# Patient Record
Sex: FEMALE | Race: BLACK OR AFRICAN AMERICAN
[De-identification: names, ages, dates, MRNs, and addresses within clinical notes are randomized per-mention and may not be internally consistent; named-entity substitution may affect disease eponyms.]

---

## 2017-12-09 ENCOUNTER — HOSPITAL ENCOUNTER (OUTPATIENT)
Dept: HOSPITAL 62 - LC | Age: 27
Discharge: HOME | End: 2017-12-09
Attending: OBSTETRICS & GYNECOLOGY
Payer: COMMERCIAL

## 2017-12-09 DIAGNOSIS — Z3A.00: ICD-10-CM

## 2017-12-09 DIAGNOSIS — O46.92: Primary | ICD-10-CM

## 2017-12-09 DIAGNOSIS — O34.211: ICD-10-CM

## 2017-12-09 LAB
APPEARANCE UR: (no result)
BARBITURATES UR QL SCN: NEGATIVE
BILIRUB UR QL STRIP: NEGATIVE
GLUCOSE UR STRIP-MCNC: >=500 MG/DL
KETONES UR STRIP-MCNC: 80 MG/DL
METHADONE UR QL SCN: NEGATIVE
NITRITE UR QL STRIP: NEGATIVE
PCP UR QL SCN: NEGATIVE
PH UR STRIP: 6 [PH] (ref 5–9)
PROT UR STRIP-MCNC: NEGATIVE MG/DL
SP GR UR STRIP: 1.04
URINE OPIATES LOW: NEGATIVE
UROBILINOGEN UR-MCNC: NEGATIVE MG/DL (ref ?–2)

## 2017-12-09 PROCEDURE — 81001 URINALYSIS AUTO W/SCOPE: CPT

## 2017-12-09 PROCEDURE — 80307 DRUG TEST PRSMV CHEM ANLYZR: CPT

## 2017-12-09 PROCEDURE — 87086 URINE CULTURE/COLONY COUNT: CPT

## 2017-12-09 PROCEDURE — 4A1HXCZ MONITORING OF PRODUCTS OF CONCEPTION, CARDIAC RATE, EXTERNAL APPROACH: ICD-10-PCS | Performed by: OBSTETRICS & GYNECOLOGY

## 2018-01-03 ENCOUNTER — HOSPITAL ENCOUNTER (EMERGENCY)
Dept: HOSPITAL 62 - ER | Age: 28
Discharge: HOME | End: 2018-01-03
Payer: COMMERCIAL

## 2018-01-03 VITALS — DIASTOLIC BLOOD PRESSURE: 81 MMHG | SYSTOLIC BLOOD PRESSURE: 134 MMHG

## 2018-01-03 DIAGNOSIS — R42: ICD-10-CM

## 2018-01-03 DIAGNOSIS — Z79.4: ICD-10-CM

## 2018-01-03 DIAGNOSIS — Z3A.32: ICD-10-CM

## 2018-01-03 DIAGNOSIS — O24.913: Primary | ICD-10-CM

## 2018-01-03 LAB
ADD MANUAL DIFF: NO
ALBUMIN SERPL-MCNC: 3.3 G/DL (ref 3.5–5)
ALP SERPL-CCNC: 118 U/L (ref 38–126)
ALT SERPL-CCNC: 27 U/L (ref 9–52)
ANION GAP SERPL CALC-SCNC: 14 MMOL/L (ref 5–19)
APPEARANCE UR: (no result)
APTT PPP: YELLOW S
AST SERPL-CCNC: 15 U/L (ref 14–36)
BASOPHILS # BLD AUTO: 0.1 10^3/UL (ref 0–0.2)
BASOPHILS NFR BLD AUTO: 0.6 % (ref 0–2)
BILIRUB DIRECT SERPL-MCNC: 0.3 MG/DL (ref 0–0.4)
BILIRUB SERPL-MCNC: 0.6 MG/DL (ref 0.2–1.3)
BILIRUB UR QL STRIP: NEGATIVE
BUN SERPL-MCNC: 12 MG/DL (ref 7–20)
CALCIUM: 9.1 MG/DL (ref 8.4–10.2)
CHLORIDE SERPL-SCNC: 108 MMOL/L (ref 98–107)
CO2 SERPL-SCNC: 12 MMOL/L (ref 22–30)
EOSINOPHIL # BLD AUTO: 0 10^3/UL (ref 0–0.6)
EOSINOPHIL NFR BLD AUTO: 0.3 % (ref 0–6)
ERYTHROCYTE [DISTWIDTH] IN BLOOD BY AUTOMATED COUNT: 14.7 % (ref 11.5–14)
GLUCOSE SERPL-MCNC: 282 MG/DL (ref 75–110)
GLUCOSE UR STRIP-MCNC: >=500 MG/DL
HCT VFR BLD CALC: 43.8 % (ref 36–47)
HGB BLD-MCNC: 14.4 G/DL (ref 12–15.5)
KETONES UR STRIP-MCNC: 80 MG/DL
LYMPHOCYTES # BLD AUTO: 2.1 10^3/UL (ref 0.5–4.7)
LYMPHOCYTES NFR BLD AUTO: 19.1 % (ref 13–45)
MCH RBC QN AUTO: 31.5 PG (ref 27–33.4)
MCHC RBC AUTO-ENTMCNC: 32.8 G/DL (ref 32–36)
MCV RBC AUTO: 96 FL (ref 80–97)
MONOCYTES # BLD AUTO: 0.7 10^3/UL (ref 0.1–1.4)
MONOCYTES NFR BLD AUTO: 6.9 % (ref 3–13)
NEUTROPHILS # BLD AUTO: 7.8 10^3/UL (ref 1.7–8.2)
NEUTS SEG NFR BLD AUTO: 73.1 % (ref 42–78)
NITRITE UR QL STRIP: NEGATIVE
PH UR STRIP: 5 [PH] (ref 5–9)
PLATELET # BLD: 287 10^3/UL (ref 150–450)
POTASSIUM SERPL-SCNC: 5.2 MMOL/L (ref 3.6–5)
PROT SERPL-MCNC: 6.2 G/DL (ref 6.3–8.2)
PROT UR STRIP-MCNC: 100 MG/DL
RBC # BLD AUTO: 4.55 10^6/UL (ref 3.72–5.28)
SODIUM SERPL-SCNC: 134.4 MMOL/L (ref 137–145)
SP GR UR STRIP: 1.03
TOTAL CELLS COUNTED % (AUTO): 100 %
UROBILINOGEN UR-MCNC: NEGATIVE MG/DL (ref ?–2)
WBC # BLD AUTO: 10.7 10^3/UL (ref 4–10.5)

## 2018-01-03 PROCEDURE — 85025 COMPLETE CBC W/AUTO DIFF WBC: CPT

## 2018-01-03 PROCEDURE — 36415 COLL VENOUS BLD VENIPUNCTURE: CPT

## 2018-01-03 PROCEDURE — 81001 URINALYSIS AUTO W/SCOPE: CPT

## 2018-01-03 PROCEDURE — 96361 HYDRATE IV INFUSION ADD-ON: CPT

## 2018-01-03 PROCEDURE — 82962 GLUCOSE BLOOD TEST: CPT

## 2018-01-03 PROCEDURE — 80053 COMPREHEN METABOLIC PANEL: CPT

## 2018-01-03 PROCEDURE — 96360 HYDRATION IV INFUSION INIT: CPT

## 2018-01-03 PROCEDURE — 99285 EMERGENCY DEPT VISIT HI MDM: CPT

## 2018-01-03 NOTE — ER DOCUMENT REPORT
ED Medical Screen (RME)





- General


Chief Complaint: High Blood Sugar


Stated Complaint: BLOOD SUGAR CONCERNS


Time Seen by Provider: 01/03/18 09:59


Notes: 





Patient is sent from the [facility] clinic for elevated blood sugar.  She 

states she has been weak lightheaded and dizzy.  At clinic her blood sugar was 

328.  She states that she has not been treated for elevated glucose during this 

pregnancy.  This was her first elevated glucose reading.  She states she is 

approximately 32 weeks gestation.


TRAVEL OUTSIDE OF THE [place] IN LAST 30 DAYS: No





- Related Data


Allergies/Adverse Reactions: 


 





No Known Allergies Allergy (Verified 12/09/17 11:37)


 











Past Medical History





- Social History


Chew tobacco use (# tins/day): No


Frequency of alcohol use: None


Drug Abuse: None


Renal/ Medical History: Denies: Hx Peritoneal Dialysis





Physical Exam





- Vital signs


Vitals: 





 











Temp Pulse Resp BP Pulse Ox


 


 97.8 F   104 H  20   133/69 H  98 


 


 01/03/18 09:53  01/03/18 09:53  01/03/18 09:53  01/03/18 09:53  01/03/18 09:53














Course





- Vital Signs


Vital signs: 





 











Temp Pulse Resp BP Pulse Ox


 


 97.8 F   104 H  20   133/69 H  98 


 


 01/03/18 09:53  01/03/18 09:53  01/03/18 09:53  01/03/18 09:53  01/03/18 09:53

## 2018-01-03 NOTE — ER DOCUMENT REPORT
ED Blood Sugar Problem





- General


Chief Complaint: High Blood Sugar


Stated Complaint: BLOOD SUGAR CONCERNS


Time Seen by Provider: 18 09:59


Mode of Arrival: Ambulatory


Information source: Patient


Notes: 





Patient is currently 32 weeks  and reports that she was seen by her OB/GYN 

this afternoon and told she had an elevated blood sugar.  Patient states that 

she does occasionally feel lightheaded and dizzy.  Patient states she has had 

some cold symptoms for the past week and a half.  Patient denies any cough or 

fever.  Patient denies any vaginal bleeding or discharge.  Patient does report 

positive fetal movement.  Patient denies any urinary complaints.  Patient 

states she has not had any consistent prenatal care with this pregnancy due to 

lack of insurance and multiple moves recently.  Patient is only previously seen 

the OB/GYN doctor today and on one prior occasion.


TRAVEL OUTSIDE OF THE [place] IN LAST 30 DAYS: No





- HPI


Onset: Just prior to arrival


Onset/Duration: Gradual


Quality of pain: No pain


Pain Level: Denies


Associated symptoms: Dizziness, Increased thirst, Frequent urination.  denies: 

Loss of consciousness, Vomiting


Similar symptoms previously: No


Recently seen / treated by doctor: Yes





- Related Data


Allergies/Adverse Reactions: 


 





No Known Allergies Allergy (Verified 17 11:37)


 











Past Medical History





- General


Information source: Patient


Last Menstrual Period: 32 weeks pregnant





- Social History


Smoking Status: Never Smoker


Chew tobacco use (# tins/day): No


Frequency of alcohol use: None


Drug Abuse: None


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


Lives with: Family


Family History: Reviewed & Not Pertinent


Patient has suicidal ideation: No


Patient has homicidal ideation: No





- Medical History


Medical History: Negative


Renal/ Medical History: Denies: Hx Peritoneal Dialysis


Past Surgical History: Reports: Hx  Section





Review of Systems





- Review of Systems


Constitutional: Recent illness - cold symptoms.  denies: Fever


EENT: Nose congestion


Cardiovascular: Dizziness, Lightheaded.  denies: Chest pain


Respiratory: No symptoms reported.  denies: Cough, Short of breath


Gastrointestinal: No symptoms reported.  denies: Abdominal pain, Nausea, 

Vomiting


Genitourinary: Frequency


Female Genitourinary: No symptoms reported, Pregnant


Musculoskeletal: No symptoms reported


Skin: No symptoms reported


Hematologic/Lymphatic: No symptoms reported


Neurological/Psychological: No symptoms reported.  denies: Headaches





Physical Exam





- Vital signs


Vitals: 


 











Temp Pulse Resp BP Pulse Ox


 


 97.8 F   104 H  20   133/69 H  98 


 


 01/03/18 09:53  18 09:53  18 09:53  18 09:53  18 09:53














- General


General appearance: Appears well, Alert


In distress: None





- HEENT


Head: Normocephalic, Atraumatic


Eyes: Normal


Conjunctiva: Normal


Nasal: Normal


Mouth/Lips: Normal


Mucous membranes: Dry


Pharynx: Normal


Neck: Normal, Supple.  No: Lymphadenopathy





- Respiratory


Respiratory status: No respiratory distress


Chest status: Nontender


Breath sounds: Normal.  No: Rales, Rhonchi, Stridor, Wheezing


Chest palpation: Normal





- Cardiovascular


Rhythm: Regular


Heart sounds: S1 appreciated, S2 appreciated


Murmur: No





- Abdominal


Inspection: Gravid female


Distension: No distension


Bowel sounds: Normal


Tenderness: Nontender


Organomegaly: No organomegaly





- Back


Back: Normal, Nontender.  No: CVA tenderness





- Extremities


General upper extremity: Normal inspection, Normal ROM


General lower extremity: Normal inspection, Normal ROM.  No: Edema





- Neurological


Neuro grossly intact: Yes


Cognition: Normal


[place] Coma Scale Eye Opening: Spontaneous


[place] Coma Scale Verbal: Oriented


[place] Coma Scale Motor: Obeys Commands


[place] Coma Scale Total: 15





- Psychological


Associated symptoms: Normal affect, Normal mood





- Skin


Skin Temperature: Warm


Skin Moisture: Dry


Skin Color: Normal





Course





- Re-evaluation


Re-evalutation: 





18 13:04


Patient reports that she is feeling much better after IV fluids.  Consulted 

with Dr. Marx regarding patient presentation, reviewed patient's diagnostic 

test results.  Advises consultation with OB/GYN provider.





Consulted with Dr. Burgos who states that patient was only sent here for 

fluids and insulin as I do not have insulin in the office.  Dr. Burgos states 

that patient had a prescription for glyburide 5 mg twice a day E scribed to the 

pharmacy already and that she should just take this to manage her diabetes and 

to follow-up in the office on Friday as planned.











- Vital Signs


Vital signs: 


 











Temp Pulse Resp BP Pulse Ox


 


 97.8 F   94   17   134/81 H  99 


 


 18 09:53  18 13:23  18 13:23  18 13:23  18 13:23














- Laboratory


Result Diagrams: 


 18 10:30





 18 11:52


Laboratory results interpreted by me: 


 











  18





  09:55 10:30 10:30


 


WBC   10.7 H 


 


RDW   14.7 H 


 


Sodium   


 


Potassium   


 


Chloride   


 


Carbon Dioxide   


 


Glucose   


 


POC Glucose  327 H  


 


Total Protein   


 


Albumin   


 


Urine Protein    100 H


 


Urine Glucose (UA)    >=500 H


 


Urine Ketones    80 H














  18





  11:52


 


WBC 


 


RDW 


 


Sodium  134.4 L


 


Potassium  5.2 H


 


Chloride  108 H


 


Carbon Dioxide  12 L


 


Glucose  282 H


 


POC Glucose 


 


Total Protein  6.2 L


 


Albumin  3.3 L


 


Urine Protein 


 


Urine Glucose (UA) 


 


Urine Ketones 











18 13:05





 Labs- Entire Visit











  18





  09:55 10:30 10:30


 


WBC   10.7 H 


 


RBC   4.55 


 


Hgb   14.4 


 


Hct   43.8 


 


MCV   96 


 


MCH   31.5 


 


MCHC   32.8 


 


RDW   14.7 H 


 


Plt Count   287 


 


Seg Neutrophils %   73.1 


 


Lymphocytes %   19.1 


 


Monocytes %   6.9 


 


Eosinophils %   0.3 


 


Basophils %   0.6 


 


Absolute Neutrophils   7.8 


 


Absolute Lymphocytes   2.1 


 


Absolute Monocytes   0.7 


 


Absolute Eosinophils   0.0 


 


Absolute Basophils   0.1 


 


Sodium    Cancelled


 


Potassium    Cancelled


 


Chloride    Cancelled


 


Carbon Dioxide    Cancelled


 


Anion Gap    Cancelled


 


BUN    Cancelled


 


Creatinine    Cancelled


 


Est GFR ( Amer)    Cancelled


 


Est GFR (Non-Af Amer)    Cancelled


 


Glucose    Cancelled


 


POC Glucose  327 H  


 


Calcium    Cancelled


 


Total Bilirubin    Cancelled


 


Direct Bilirubin    Cancelled


 


Neonat Total Bilirubin    Cancelled


 


Neonat Direct Bilirubin    Cancelled


 


Neonat Indirect Bili    Cancelled


 


AST    Cancelled


 


ALT    Cancelled


 


Alkaline Phosphatase    Cancelled


 


Total Protein    Cancelled


 


Albumin    Cancelled


 


Urine Color   


 


Urine Appearance   


 


Urine pH   


 


Ur Specific Gravity   


 


Urine Protein   


 


Urine Glucose (UA)   


 


Urine Ketones   


 


Urine Blood   


 


Urine Nitrite   


 


Urine Bilirubin   


 


Urine Urobilinogen   


 


Ur Leukocyte Esterase   


 


Urine WBC (Auto)   


 


Urine RBC (Auto)   


 


U Hyaline Cast (Auto)   


 


Urine Bacteria (Auto)   


 


Squamous Epi Cells Auto   


 


Urine Mucus (Auto)   


 


Urine Ascorbic Acid   














  18





  10:30 11:52


 


WBC  


 


RBC  


 


Hgb  


 


Hct  


 


MCV  


 


MCH  


 


MCHC  


 


RDW  


 


Plt Count  


 


Seg Neutrophils %  


 


Lymphocytes %  


 


Monocytes %  


 


Eosinophils %  


 


Basophils %  


 


Absolute Neutrophils  


 


Absolute Lymphocytes  


 


Absolute Monocytes  


 


Absolute Eosinophils  


 


Absolute Basophils  


 


Sodium   134.4 L


 


Potassium   5.2 H


 


Chloride   108 H


 


Carbon Dioxide   12 L


 


Anion Gap   14


 


BUN   12


 


Creatinine   0.73


 


Est GFR ( Amer)   > 60


 


Est GFR (Non-Af Amer)   > 60


 


Glucose   282 H


 


POC Glucose  


 


Calcium   9.1


 


Total Bilirubin   0.6


 


Direct Bilirubin   0.3


 


Neonat Total Bilirubin   Not Reportable


 


Neonat Direct Bilirubin   Not Reportable


 


Neonat Indirect Bili   Not Reportable


 


AST   15


 


ALT   27


 


Alkaline Phosphatase   118


 


Total Protein   6.2 L


 


Albumin   3.3 L


 


Urine Color  YELLOW 


 


Urine Appearance  SLIGHTLY-CLOUDY 


 


Urine pH  5.0 


 


Ur Specific Gravity  1.031 


 


Urine Protein  100 H 


 


Urine Glucose (UA)  >=500 H 


 


Urine Ketones  80 H 


 


Urine Blood  NEGATIVE 


 


Urine Nitrite  NEGATIVE 


 


Urine Bilirubin  NEGATIVE 


 


Urine Urobilinogen  NEGATIVE 


 


Ur Leukocyte Esterase  NEGATIVE 


 


Urine WBC (Auto)  5 


 


Urine RBC (Auto)  1 


 


U Hyaline Cast (Auto)  1 


 


Urine Bacteria (Auto)  TRACE 


 


Squamous Epi Cells Auto  2 


 


Urine Mucus (Auto)  RARE 


 


Urine Ascorbic Acid  NEGATIVE 














Discharge





- Discharge


Clinical Impression: 


Diabetes


Qualifiers:


 Diabetes mellitus type: type 2 Diabetes mellitus complication status: with 

hyperglycemia Diabetes mellitus long term insulin use: without long term use 

Qualified Code(s): E11.65 - Type 2 diabetes mellitus with hyperglycemia





Condition: Stable


Disposition: HOME, SELF-CARE


Instructions:  Diabetes ([facility]), Intravenous (IV) Fluids ([facility])


Additional Instructions: 


Return immediately for any new or worsening symptoms





Followup with your primary care provider, call tomorrow to make a followup 

appointment





Get the oral diabetic medication filled as prescribed and take as directed.





Your potassium was minimally elevated today, your primary doctor can recheck 

this in the office when you see them.





Check your blood sugar daily.





It is important that you maintain a diabetic diet.  Review the handout provided 

by your ob/gyn provider.


Forms:  Return to Work


Referrals: 


MICHELLE PIZARRO MD [Primary Care Provider] - 18

## 2018-01-09 ENCOUNTER — HOSPITAL ENCOUNTER (INPATIENT)
Dept: HOSPITAL 62 - LC | Age: 28
LOS: 3 days | Discharge: HOME | DRG: 781 | End: 2018-01-12
Attending: OBSTETRICS & GYNECOLOGY | Admitting: OBSTETRICS & GYNECOLOGY
Payer: COMMERCIAL

## 2018-01-09 DIAGNOSIS — Z3A.33: ICD-10-CM

## 2018-01-09 DIAGNOSIS — O24.410: Primary | ICD-10-CM

## 2018-01-09 DIAGNOSIS — O34.211: ICD-10-CM

## 2018-01-09 LAB
ADD MANUAL DIFF: NO
ALBUMIN SERPL-MCNC: 3 G/DL (ref 3.5–5)
ALP SERPL-CCNC: 108 U/L (ref 38–126)
ALT SERPL-CCNC: 60 U/L (ref 9–52)
ANION GAP SERPL CALC-SCNC: 14 MMOL/L (ref 5–19)
APPEARANCE UR: CLEAR
APTT PPP: YELLOW S
AST SERPL-CCNC: 27 U/L (ref 14–36)
BARBITURATES UR QL SCN: NEGATIVE
BASOPHILS # BLD AUTO: 0.1 10^3/UL (ref 0–0.2)
BASOPHILS NFR BLD AUTO: 0.6 % (ref 0–2)
BILIRUB DIRECT SERPL-MCNC: 0.3 MG/DL (ref 0–0.4)
BILIRUB SERPL-MCNC: 0.4 MG/DL (ref 0.2–1.3)
BILIRUB UR QL STRIP: NEGATIVE
BUN SERPL-MCNC: 12 MG/DL (ref 7–20)
CALCIUM: 9.2 MG/DL (ref 8.4–10.2)
CHLORIDE SERPL-SCNC: 104 MMOL/L (ref 98–107)
CO2 SERPL-SCNC: 12 MMOL/L (ref 22–30)
EOSINOPHIL # BLD AUTO: 0 10^3/UL (ref 0–0.6)
EOSINOPHIL NFR BLD AUTO: 0.4 % (ref 0–6)
ERYTHROCYTE [DISTWIDTH] IN BLOOD BY AUTOMATED COUNT: 14.7 % (ref 11.5–14)
GLUCOSE SERPL-MCNC: 222 MG/DL (ref 75–110)
GLUCOSE UR STRIP-MCNC: >=500 MG/DL
HCT VFR BLD CALC: 37.6 % (ref 36–47)
HGB BLD-MCNC: 12.6 G/DL (ref 12–15.5)
KETONES UR STRIP-MCNC: 80 MG/DL
LYMPHOCYTES # BLD AUTO: 1.5 10^3/UL (ref 0.5–4.7)
LYMPHOCYTES NFR BLD AUTO: 17.7 % (ref 13–45)
MCH RBC QN AUTO: 31.6 PG (ref 27–33.4)
MCHC RBC AUTO-ENTMCNC: 33.4 G/DL (ref 32–36)
MCV RBC AUTO: 95 FL (ref 80–97)
METHADONE UR QL SCN: NEGATIVE
MONOCYTES # BLD AUTO: 0.9 10^3/UL (ref 0.1–1.4)
MONOCYTES NFR BLD AUTO: 10.8 % (ref 3–13)
NEUTROPHILS # BLD AUTO: 6.2 10^3/UL (ref 1.7–8.2)
NEUTS SEG NFR BLD AUTO: 70.5 % (ref 42–78)
NITRITE UR QL STRIP: NEGATIVE
PCP UR QL SCN: NEGATIVE
PH UR STRIP: 6 [PH] (ref 5–9)
PLATELET # BLD: 212 10^3/UL (ref 150–450)
POTASSIUM SERPL-SCNC: 3.9 MMOL/L (ref 3.6–5)
PROT SERPL-MCNC: 5.7 G/DL (ref 6.3–8.2)
PROT UR STRIP-MCNC: 30 MG/DL
RBC # BLD AUTO: 3.97 10^6/UL (ref 3.72–5.28)
SODIUM SERPL-SCNC: 130.4 MMOL/L (ref 137–145)
SP GR UR STRIP: 1.02
TOTAL CELLS COUNTED % (AUTO): 100 %
URINE AMPHETAMINES SCREEN: NEGATIVE
URINE BENZODIAZEPINES SCREEN: NEGATIVE
URINE COCAINE SCREEN: NEGATIVE
URINE MARIJUANA (THC) SCREEN: NEGATIVE
UROBILINOGEN UR-MCNC: NEGATIVE MG/DL (ref ?–2)
WBC # BLD AUTO: 8.7 10^3/UL (ref 4–10.5)

## 2018-01-09 PROCEDURE — 4A1HXCZ MONITORING OF PRODUCTS OF CONCEPTION, CARDIAC RATE, EXTERNAL APPROACH: ICD-10-PCS | Performed by: OBSTETRICS & GYNECOLOGY

## 2018-01-09 PROCEDURE — 36415 COLL VENOUS BLD VENIPUNCTURE: CPT

## 2018-01-09 PROCEDURE — 82962 GLUCOSE BLOOD TEST: CPT

## 2018-01-09 PROCEDURE — 81001 URINALYSIS AUTO W/SCOPE: CPT

## 2018-01-09 PROCEDURE — 80307 DRUG TEST PRSMV CHEM ANLYZR: CPT

## 2018-01-09 PROCEDURE — 83036 HEMOGLOBIN GLYCOSYLATED A1C: CPT

## 2018-01-09 PROCEDURE — 59025 FETAL NON-STRESS TEST: CPT

## 2018-01-09 PROCEDURE — 80053 COMPREHEN METABOLIC PANEL: CPT

## 2018-01-09 PROCEDURE — 85025 COMPLETE CBC W/AUTO DIFF WBC: CPT

## 2018-01-10 RX ADMIN — Medication SCH CAP: at 09:31

## 2018-01-10 RX ADMIN — MICONAZOLE NITRATE SCH APPLIC: 20 CREAM VAGINAL at 22:01

## 2018-01-10 RX ADMIN — INSULIN HUMAN PRN UNIT: 100 INJECTION, SOLUTION PARENTERAL at 16:16

## 2018-01-10 RX ADMIN — INSULIN HUMAN PRN UNIT: 100 INJECTION, SOLUTION PARENTERAL at 22:00

## 2018-01-10 NOTE — PDOC PROGRESS REPORT
Subjective


Progress Note for:: 01/10/18


Subjective:: 





Pt. reports to have been feeling dizzy on and off when first diagnosed. Doing 

well today. Reports +FM, denies LOF/bleeding/ctx. Has multiple questions about 

diabetic diet and plan of care. Reports she had stopped eating carbs altogether 

since diagnosis and is frustrated that no matter what she is doing or what 

medications she takes or is given her glucose is still high. 


Reason For Visit: 


DIABETES IN PREGNANCY








Physical Exam





- Physical Exam


Vital Signs: 


 











Temp Pulse Resp BP Pulse Ox


 


 97.5 F   90   18   120/74   100 


 


 01/10/18 07:51  01/10/18 07:51  01/10/18 07:51  01/10/18 07:51  01/10/18 07:51








 Intake & Output











 01/09/18 01/10/18 01/11/18





 06:59 06:59 06:59


 


Intake Total  400 480


 


Balance  400 480


 


Weight  124.2 kg 











General appearance: PRESENT: no acute distress


Psychiatric exam: PRESENT: appropriate affect


Additional comments: 





VSS, afebrile, gravid abdomen. 





Result


Laboratory Results: 


 





 01/09/18 13:17 





 01/09/18 13:17 








Impressions: 





hgb A1c 8.6





Assessment & Plan





- Diagnosis


(1) Gestational diabetes mellitus (GDM) affecting second pregnancy


Is this a current diagnosis for this admission?: Yes   


Plan: 


had long discussion with patient and reviewed GDM diet and plan of care. Pt. 

asked questions and verbalized understanding. Will stay inpatient until glucose 

well controlled on insulin. Dr. Burgos in unit and managing glucose.

## 2018-01-11 RX ADMIN — INSULIN HUMAN PRN UNIT: 100 INJECTION, SOLUTION PARENTERAL at 13:30

## 2018-01-11 RX ADMIN — INSULIN HUMAN PRN UNIT: 100 INJECTION, SOLUTION PARENTERAL at 15:04

## 2018-01-11 RX ADMIN — INSULIN HUMAN PRN UNIT: 100 INJECTION, SOLUTION PARENTERAL at 20:06

## 2018-01-11 RX ADMIN — Medication SCH CAP: at 11:13

## 2018-01-11 RX ADMIN — MICONAZOLE NITRATE SCH APPLIC: 20 CREAM VAGINAL at 22:33

## 2018-01-11 NOTE — PDOC PROGRESS REPORT
Subjective


Progress Note for:: 01/11/18


Subjective:: 





pt alert and stable without complaints


Reason For Visit: 


DIABETES IN PREGNANCY








Physical Exam





- Physical Exam


Vital Signs: 


 











Temp Pulse Resp BP Pulse Ox


 


 97.5 F   91   16   107/54 L  100 


 


 01/11/18 08:10  01/11/18 08:10  01/11/18 08:10  01/11/18 08:10  01/11/18 08:10








 Intake & Output











 01/10/18 01/11/18 01/12/18





 06:59 06:59 06:59


 


Intake Total 400 480 


 


Balance 400 480 


 


Weight 124.2 kg  











General appearance: PRESENT: no acute distress


Neurological exam: PRESENT: alert





Result


Laboratory Results: 


 





 01/09/18 13:17 





 01/09/18 13:17 











Assessment & Plan





- Diagnosis


(1) Gestational diabetes mellitus (GDM) affecting second pregnancy


Is this a current diagnosis for this admission?: Yes   





- Time


Time Spent with patient: Less than 15 minutes


Anticipated discharge: Home


Within: within 48 hours





- Plan Summary


Plan Summary: 





continue to adjust insulin and plan d/c when goals met

## 2018-01-12 VITALS — SYSTOLIC BLOOD PRESSURE: 127 MMHG | DIASTOLIC BLOOD PRESSURE: 60 MMHG

## 2018-01-12 RX ADMIN — Medication SCH CAP: at 10:59

## 2018-01-12 RX ADMIN — INSULIN HUMAN PRN UNIT: 100 INJECTION, SOLUTION PARENTERAL at 12:10

## 2018-01-12 NOTE — PROGRESS NOTE
Provider Note


Provider Note: 





S:Pt states readiness for d/c. Has been administering own insulin and is able 

to verbalize s/sx hyper/hypoglycemia and when to seek care, states active baby 

denies vb, or lof, denies ctx.


O: VSS, afebrile


    See flow sheet for blood glucose levels


    NST: Cat 1


A: IUP at 33w 


    New dx GDM on insulin with improved glucose control


    


P: c/w dr. kebede


    Pt to be d/c home today with in office f/u for nst/jf on mon or tues


    Insulin: Regular B-15 units, Dinner 8 units


               NPH: B-25 units, Dinner 12 units


    PTL prec. fm counts

## 2018-01-12 NOTE — PDOC DISCHARGE SUMMARY
General





- Admit/Disc Date/PCP


Admission Date/Primary Care Provider: 


  01/09/18 14:40





  MICHELLE PIZARRO MD





Discharge Date: 01/12/18





- Discharge Diagnosis


(1) Gestational diabetes mellitus (GDM) affecting second pregnancy


Is this a current diagnosis for this admission?: Yes   


Summary: 


pt started on insulin


d/c insulin breakfast 15 units regular and 25 units nph, dinner 8 units regular

, 12 units nph


pt to follow up in office monday or tuesday








- Additional Information


Discharge Diet: Diabetic


Discharge Activity: Activity As Tolerated


Prescriptions: 


Insulin Regular, Human [Humulin R (Reg) Insulin 100 unit/mL] 15 unit SUBCUT 

ACBRKFST #300 unit


Insulin Regular, Human [Humulin R (Reg) Insulin 100 unit/mL] 8 unit SUBCUT 

ACSUPPER #300 unit


NPH, Human Insulin Isophane [Humulin N (NPH) Insulin 100 unit/mL] 25 unit 

SUBCUT ACBRKFST #300 unit


NPH, Human Insulin Isophane [Humulin N (NPH) Insulin 100 unit/mL] 12 unit 

SUBCUT ACSUPPER #300 unit


Home Medications: 








Prenatal Vit,Calc76/Iron/Folic [Pnv 29-1 Tablet] 1 tab PO DAILY 12/09/17 


Insulin Regular, Human [Humulin R (Reg) Insulin 100 unit/mL] 8 unit SUBCUT 

ACSUPPER #300 unit 01/12/18 


Insulin Regular, Human [Humulin R (Reg) Insulin 100 unit/mL] 15 unit SUBCUT 

ACBRKFST #300 unit 01/12/18 


NPH, Human Insulin Isophane [Humulin N (NPH) Insulin 100 unit/mL] 12 unit 

SUBCUT ACSUPPER #300 unit 01/12/18 


NPH, Human Insulin Isophane [Humulin N (NPH) Insulin 100 unit/mL] 25 unit 

SUBCUT ACBRKFST #300 unit 01/12/18 











History of Present Illness


Patient complains of: pt sent for insulin initiation and blood sugar regulation


History of Present Illness: 


NAYELY MORALES is a 27 year old female








Physical Exam





- Physical Exam


Vital Signs: 


 











Temp Pulse Resp BP Pulse Ox


 


 97.5 F   84   16   127/60 H  99 


 


 01/12/18 13:05  01/12/18 13:05  01/12/18 13:05  01/12/18 13:05  01/12/18 13:05








 Intake & Output











 01/11/18 01/12/18 01/13/18





 06:59 06:59 06:59


 


Intake Total 480 1240 600


 


Balance 480 1240 600











General appearance: PRESENT: no acute distress


Rectal exam: PRESENT: deferred


Musculoskeletal exam: PRESENT: ambulatory


Neurological exam: PRESENT: alert, oriented to person, oriented to place, 

oriented to time


Additional comments: 





gravid





- Obstetrical Exam


Tender: No





Result


Laboratory Results: 


 





 01/09/18 13:17 





 01/09/18 13:17 











Plan


Discharge Plan: 





d/c home on insulin as above


f/u in office mon or tues for nst


ptl prec, fm counts, reviewed s/sx hyper/hypoglycemia


Time Spent: Less than 30 Minutes

## 2018-01-18 NOTE — ADMISSION PHYSICAL
=================================================================



=================================================================

Datetime Report Generated by CPN: 2018 08:45

   

   

=================================================================

CURRENT ADMISSION

=================================================================

   

Chief Complaint:  Other

Chief Complaint Other:  Elevated blood sugar

Indication for Induction:  Not Applicable

Admit Plan:  Admit to Unit

Admit Plan- Other:  Begin insulin

   

=================================================================

ALLERGIES

=================================================================

   

Medication Allergies:  No

Medication Allergies:  No Known Allergies (2017)

Latex:  No Latex Allergies

   

=================================================================

OBSTETRICAL HISTORY

=================================================================

   

EDC:  2018 00:00

:  2

Para:  1

Term:  1

:  0

SAB:  0

IAB:  0

Ectopic:  0

Livin

Cesareans:  1

VBACs:  0

Multiple Births:  0

Gestational Diabetes:  No

Rh Sensitization:  No

Incompetent Cervix:  No

LINDA:  No

Infertility:  No

ART Treatment:  No

Uterine Anomaly:  No

IUGR:  No

Hx Previous C/S:  Yes

Macrosomia:  No

Hx Loss/Stillborn:  No

PIH:  No

Hx  Death:  No

Placenta Previa/Abruption:  No

Depression/PP Depression:  No

PTL/PROM:  No

Post Partum Hemorrhage:  No

Current Pregnancy Procedures:  Ultrasound

Obstetrical History Comments:  2015 , true knot, baby

   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

   G2 current

   

=================================================================

***SEE PRENATAL RECORDS***

=================================================================

   

Alcohol:  No

Marijuana :  No

Cocaine:  No

Other Illicit Drugs:  No

Cigarettes:  Never Smoker. [id number]

   

=================================================================

MEDICAL HISTORY

=================================================================

   

Diabetes:  No

Blood Transfusion:  No

Pulmonary Disease (Asthma, TB):  No

Breast Disease:  No

Hypertension:  No

Gyn Surgery:  No

Heart Disease:  No

Hosp/Surgery:  Yes

Autoimmune Disorder:  No

Anesthetic Complications:  No

Kidney Disease:  No

Abnormal Pap Smear:  No

Neuro/Epilepsy:  No

Psychiatric Disorders:  No

Other Medical Diseases:  No

Hepatitis/Liver Disease:  No

Significant Family History:  No

Varicosities/Phlebitis:  No

Trauma/Violence :  No

Thyroid Dysfunction:  No

   

=================================================================

INFECTIOUS HISTORY

=================================================================

   

Gonorrhea:  No

Genital Herpes:  No

Chlamydia:  No

Tuberculosis:  No

Syphilis:  No

Hepatitis:  No

HIV/AIDS Exposure:  No

Rash or Viral Illness:  No

HPV:  No

   

=================================================================

PHYSICAL EXAM

=================================================================

   

General:  Normal

HEENT:  Normal

Neurologic:  Normal

Thyroid:  Normal

Heart:  Normal

Lungs:  Normal

Breast:  Deferred

Back:  Normal

Abdomen:  Normal

Genitourinary Exam:  Normal

Extremities:  Normal

DTRs:  Normal

Pelvic Type:  Adequate

Vital Signs:  Reviewed

   

=================================================================

MEMBRANES

=================================================================

   

Pooling:  Negative

Membranes:  Intact

   

=================================================================

FETUS A

=================================================================

   

EGA:  33.1

FHR- Baseline:  140

FHR Category:  Category I

Admit Comment:  Admit for insulin patterning

   

=================================================================

PLANS FOR LABOR AND DELIVERY

=================================================================

   

Labor and Delivery:  Birth Plan

Pain Management:  Epidural

Feeding Preference:  Breast

Benefit of Breast Feed Discussed:  Yes

Circumcision:  N/A

   

=================================================================

INFORMED CONSENT

=================================================================

   

Signature:  Electronically signed by Svitlana Erazo MD ([email or web address]) on

   2018 at 14:36  with User ID: [email or web address]

## 2018-01-24 ENCOUNTER — HOSPITAL ENCOUNTER (OUTPATIENT)
Dept: HOSPITAL 62 - LC | Age: 28
Discharge: HOME | End: 2018-01-24
Attending: OBSTETRICS & GYNECOLOGY
Payer: COMMERCIAL

## 2018-01-24 DIAGNOSIS — O26.893: ICD-10-CM

## 2018-01-24 DIAGNOSIS — Z3A.35: ICD-10-CM

## 2018-01-24 DIAGNOSIS — E86.0: ICD-10-CM

## 2018-01-24 DIAGNOSIS — O47.03: Primary | ICD-10-CM

## 2018-01-24 LAB
APPEARANCE UR: (no result)
APTT PPP: (no result) S
BARBITURATES UR QL SCN: NEGATIVE
BILIRUB UR QL STRIP: NEGATIVE
GLUCOSE UR STRIP-MCNC: 50 MG/DL
KETONES UR STRIP-MCNC: 80 MG/DL
METHADONE UR QL SCN: NEGATIVE
NITRITE UR QL STRIP: NEGATIVE
PCP UR QL SCN: NEGATIVE
PH UR STRIP: 5 [PH] (ref 5–9)
PROT UR STRIP-MCNC: 100 MG/DL
SP GR UR STRIP: 1.03
URINE AMPHETAMINES SCREEN: NEGATIVE
URINE BENZODIAZEPINES SCREEN: NEGATIVE
URINE COCAINE SCREEN: NEGATIVE
URINE MARIJUANA (THC) SCREEN: NEGATIVE
UROBILINOGEN UR-MCNC: NEGATIVE MG/DL (ref ?–2)

## 2018-01-24 PROCEDURE — 59025 FETAL NON-STRESS TEST: CPT

## 2018-01-24 PROCEDURE — 80307 DRUG TEST PRSMV CHEM ANLYZR: CPT

## 2018-01-24 PROCEDURE — 4A1HXCZ MONITORING OF PRODUCTS OF CONCEPTION, CARDIAC RATE, EXTERNAL APPROACH: ICD-10-PCS | Performed by: OBSTETRICS & GYNECOLOGY

## 2018-01-24 PROCEDURE — 81001 URINALYSIS AUTO W/SCOPE: CPT

## 2018-01-24 NOTE — NON STRESS TEST REPORT
=================================================================

Non Stress Test

=================================================================

Datetime Report Generated by CPN: 01/24/2018 03:03

   

   

=================================================================

DEMOGRAPHIC

=================================================================

   

EGA NST:  35.2

EGA NST:  33.1

   

=================================================================

INDICATION

=================================================================

   

Indication for Study:  Ordered by Provider

Indication for Study:  Diabetes Mellitus

   

=================================================================

MONITORING

=================================================================

   

Monitor Explained:  Monitor Explained; Test Explained; Patient

   Verbalized Understanding

Monitor Explained:  Monitor Explained; Test Explained; Patient

   Verbalized Understanding

Time on Monitor:  01/24/2018 01:15

Time on Monitor:  01/09/2018 12:05

Time off Monitor:  01/24/2018 02:00

Time off Monitor:  01/09/2018 13:45

NST Duration:  45

NST Duration:  100

   

=================================================================

NST INTERVENTIONS

=================================================================

   

NST Interventions:  PO Hydration; Reposition Patient

NST Interventions:  PO Hydration; Reposition Patient

Physician Notified NST:  Dr Long

Physician Notified NST:  MONSERRAT Erickson

BABY A:  [id number]

   

=================================================================

BABY A

=================================================================

   

Fetal Movement :  Present

Fetal Movement :  Present

Contraction Frequency :  irregular

Contraction Frequency :  None

FHR Baseline :  140

FHR Baseline :  140

Accelerations :  15X15

Accelerations :  15X15

Decelerations :  None

Decelerations :  None

Variability :  Moderate 6-25bpm

Variability :  Moderate 6-25bpm

NST Review:  Meets Criteria for Reactive NST

NST Review:  Meets Criteria for Reactive NST

NST Review and Verified By :  TREVON daigle

NST Review and Verified By :  YULI Partida RN

NST Results:  Reactive

NST Results:  Reactive

   

=================================================================

NST REPORT

=================================================================

   

Report Trigger:  Send Report

## 2018-01-30 ENCOUNTER — HOSPITAL ENCOUNTER (OUTPATIENT)
Dept: HOSPITAL 62 - LC | Age: 28
Discharge: HOME | End: 2018-01-30
Attending: OBSTETRICS & GYNECOLOGY
Payer: COMMERCIAL

## 2018-01-30 DIAGNOSIS — Z3A.36: ICD-10-CM

## 2018-01-30 DIAGNOSIS — Z34.93: Primary | ICD-10-CM

## 2018-01-30 PROCEDURE — 4A1HXCZ MONITORING OF PRODUCTS OF CONCEPTION, CARDIAC RATE, EXTERNAL APPROACH: ICD-10-PCS | Performed by: OBSTETRICS & GYNECOLOGY

## 2018-01-30 PROCEDURE — 59025 FETAL NON-STRESS TEST: CPT

## 2018-02-16 ENCOUNTER — HOSPITAL ENCOUNTER (OUTPATIENT)
Dept: HOSPITAL 62 - LC | Age: 28
Discharge: HOME | End: 2018-02-16
Attending: OBSTETRICS & GYNECOLOGY
Payer: COMMERCIAL

## 2018-02-16 DIAGNOSIS — Z3A.38: ICD-10-CM

## 2018-02-16 DIAGNOSIS — O47.1: Primary | ICD-10-CM

## 2018-02-16 LAB
ADD MANUAL DIFF: NO
ALBUMIN SERPL-MCNC: 2.6 G/DL (ref 3.5–5)
ALP SERPL-CCNC: 110 U/L (ref 38–126)
ALT SERPL-CCNC: 26 U/L (ref 9–52)
AMYLASE SERPL-CCNC: 53 U/L (ref 30–110)
ANION GAP SERPL CALC-SCNC: 7 MMOL/L (ref 5–19)
APPEARANCE UR: (no result)
APTT PPP: (no result) S
AST SERPL-CCNC: 12 U/L (ref 14–36)
BARBITURATES UR QL SCN: NEGATIVE
BASOPHILS # BLD AUTO: 0 10^3/UL (ref 0–0.2)
BASOPHILS NFR BLD AUTO: 0.2 % (ref 0–2)
BILIRUB DIRECT SERPL-MCNC: 0.3 MG/DL (ref 0–0.4)
BILIRUB SERPL-MCNC: 0.4 MG/DL (ref 0.2–1.3)
BILIRUB UR QL STRIP: NEGATIVE
BUN SERPL-MCNC: 6 MG/DL (ref 7–20)
CALCIUM: 9 MG/DL (ref 8.4–10.2)
CHLORIDE SERPL-SCNC: 106 MMOL/L (ref 98–107)
CO2 SERPL-SCNC: 22 MMOL/L (ref 22–30)
EOSINOPHIL # BLD AUTO: 0 10^3/UL (ref 0–0.6)
EOSINOPHIL NFR BLD AUTO: 0.5 % (ref 0–6)
ERYTHROCYTE [DISTWIDTH] IN BLOOD BY AUTOMATED COUNT: 15.3 % (ref 11.5–14)
GLUCOSE SERPL-MCNC: 140 MG/DL (ref 75–110)
GLUCOSE UR STRIP-MCNC: >=500 MG/DL
HCT VFR BLD CALC: 31.8 % (ref 36–47)
HGB BLD-MCNC: 10.6 G/DL (ref 12–15.5)
KETONES UR STRIP-MCNC: NEGATIVE MG/DL
LIPASE SERPL-CCNC: 41.5 U/L (ref 23–300)
LYMPHOCYTES # BLD AUTO: 1.8 10^3/UL (ref 0.5–4.7)
LYMPHOCYTES NFR BLD AUTO: 19 % (ref 13–45)
MCH RBC QN AUTO: 31.1 PG (ref 27–33.4)
MCHC RBC AUTO-ENTMCNC: 33.3 G/DL (ref 32–36)
MCV RBC AUTO: 94 FL (ref 80–97)
METHADONE UR QL SCN: NEGATIVE
MONOCYTES # BLD AUTO: 0.9 10^3/UL (ref 0.1–1.4)
MONOCYTES NFR BLD AUTO: 8.9 % (ref 3–13)
NEUTROPHILS # BLD AUTO: 6.9 10^3/UL (ref 1.7–8.2)
NEUTS SEG NFR BLD AUTO: 71.4 % (ref 42–78)
NITRITE UR QL STRIP: NEGATIVE
PCP UR QL SCN: NEGATIVE
PH UR STRIP: 6 [PH] (ref 5–9)
PLATELET # BLD: 211 10^3/UL (ref 150–450)
POTASSIUM SERPL-SCNC: 4.1 MMOL/L (ref 3.6–5)
PROT SERPL-MCNC: 5.8 G/DL (ref 6.3–8.2)
PROT UR STRIP-MCNC: NEGATIVE MG/DL
RBC # BLD AUTO: 3.4 10^6/UL (ref 3.72–5.28)
SODIUM SERPL-SCNC: 134.7 MMOL/L (ref 137–145)
SP GR UR STRIP: 1.02
TOTAL CELLS COUNTED % (AUTO): 100 %
URINE AMPHETAMINES SCREEN: NEGATIVE
URINE BENZODIAZEPINES SCREEN: NEGATIVE
URINE COCAINE SCREEN: NEGATIVE
URINE MARIJUANA (THC) SCREEN: NEGATIVE
UROBILINOGEN UR-MCNC: NEGATIVE MG/DL (ref ?–2)
WBC # BLD AUTO: 9.6 10^3/UL (ref 4–10.5)

## 2018-02-16 PROCEDURE — 85025 COMPLETE CBC W/AUTO DIFF WBC: CPT

## 2018-02-16 PROCEDURE — 36415 COLL VENOUS BLD VENIPUNCTURE: CPT

## 2018-02-16 PROCEDURE — 83690 ASSAY OF LIPASE: CPT

## 2018-02-16 PROCEDURE — 81001 URINALYSIS AUTO W/SCOPE: CPT

## 2018-02-16 PROCEDURE — 80307 DRUG TEST PRSMV CHEM ANLYZR: CPT

## 2018-02-16 PROCEDURE — 59025 FETAL NON-STRESS TEST: CPT

## 2018-02-16 PROCEDURE — 82150 ASSAY OF AMYLASE: CPT

## 2018-02-16 PROCEDURE — 4A1HXCZ MONITORING OF PRODUCTS OF CONCEPTION, CARDIAC RATE, EXTERNAL APPROACH: ICD-10-PCS | Performed by: OBSTETRICS & GYNECOLOGY

## 2018-02-16 PROCEDURE — 80053 COMPREHEN METABOLIC PANEL: CPT

## 2019-01-24 ENCOUNTER — HOSPITAL ENCOUNTER (EMERGENCY)
Dept: HOSPITAL 62 - ER | Age: 29
Discharge: HOME | End: 2019-01-24
Payer: COMMERCIAL

## 2019-01-24 VITALS — DIASTOLIC BLOOD PRESSURE: 72 MMHG | SYSTOLIC BLOOD PRESSURE: 134 MMHG

## 2019-01-24 DIAGNOSIS — R35.8: ICD-10-CM

## 2019-01-24 DIAGNOSIS — E11.65: Primary | ICD-10-CM

## 2019-01-24 DIAGNOSIS — R63.2: ICD-10-CM

## 2019-01-24 DIAGNOSIS — R63.1: ICD-10-CM

## 2019-01-24 DIAGNOSIS — Z79.4: ICD-10-CM

## 2019-01-24 LAB
ADD MANUAL DIFF: NO
ALBUMIN SERPL-MCNC: 4.7 G/DL (ref 3.5–5)
ALP SERPL-CCNC: 139 U/L (ref 38–126)
ALT SERPL-CCNC: 26 U/L (ref 9–52)
ANION GAP SERPL CALC-SCNC: 9 MMOL/L (ref 5–19)
APPEARANCE UR: CLEAR
APTT PPP: COLORLESS S
AST SERPL-CCNC: 30 U/L (ref 14–36)
BASE EXCESS BLDV CALC-SCNC: 2.1 MMOL/L
BASOPHILS # BLD AUTO: 0.1 10^3/UL (ref 0–0.2)
BASOPHILS NFR BLD AUTO: 0.7 % (ref 0–2)
BILIRUB DIRECT SERPL-MCNC: 0.4 MG/DL (ref 0–0.4)
BILIRUB SERPL-MCNC: 0.6 MG/DL (ref 0.2–1.3)
BILIRUB UR QL STRIP: NEGATIVE
BUN SERPL-MCNC: 15 MG/DL (ref 7–20)
CALCIUM: 9.7 MG/DL (ref 8.4–10.2)
CHLORIDE SERPL-SCNC: 96 MMOL/L (ref 98–107)
CO2 SERPL-SCNC: 29 MMOL/L (ref 22–30)
EOSINOPHIL # BLD AUTO: 0.1 10^3/UL (ref 0–0.6)
EOSINOPHIL NFR BLD AUTO: 1 % (ref 0–6)
ERYTHROCYTE [DISTWIDTH] IN BLOOD BY AUTOMATED COUNT: 13.3 % (ref 11.5–14)
GLUCOSE SERPL-MCNC: 531 MG/DL (ref 75–110)
GLUCOSE UR STRIP-MCNC: >=500 MG/DL
HCO3 BLDV-SCNC: 28.6 MMOL/L (ref 20–32)
HCT VFR BLD CALC: 44.9 % (ref 36–47)
HGB BLD-MCNC: 15 G/DL (ref 12–15.5)
KETONES UR STRIP-MCNC: (no result) MG/DL
LYMPHOCYTES # BLD AUTO: 2.7 10^3/UL (ref 0.5–4.7)
LYMPHOCYTES NFR BLD AUTO: 34.9 % (ref 13–45)
MCH RBC QN AUTO: 30.7 PG (ref 27–33.4)
MCHC RBC AUTO-ENTMCNC: 33.4 G/DL (ref 32–36)
MCV RBC AUTO: 92 FL (ref 80–97)
MONOCYTES # BLD AUTO: 0.5 10^3/UL (ref 0.1–1.4)
MONOCYTES NFR BLD AUTO: 6.2 % (ref 3–13)
NEUTROPHILS # BLD AUTO: 4.4 10^3/UL (ref 1.7–8.2)
NEUTS SEG NFR BLD AUTO: 57.2 % (ref 42–78)
NITRITE UR QL STRIP: NEGATIVE
PCO2 BLDV: 51.9 MMHG (ref 35–63)
PH BLDV: 7.36 [PH] (ref 7.3–7.42)
PH UR STRIP: 6 [PH] (ref 5–9)
PLATELET # BLD: 253 10^3/UL (ref 150–450)
POTASSIUM SERPL-SCNC: 4.8 MMOL/L (ref 3.6–5)
PROT SERPL-MCNC: 8.2 G/DL (ref 6.3–8.2)
PROT UR STRIP-MCNC: NEGATIVE MG/DL
RBC # BLD AUTO: 4.88 10^6/UL (ref 3.72–5.28)
SODIUM SERPL-SCNC: 134.1 MMOL/L (ref 137–145)
SP GR UR STRIP: 1.03
TOTAL CELLS COUNTED % (AUTO): 100 %
UROBILINOGEN UR-MCNC: NEGATIVE MG/DL (ref ?–2)
WBC # BLD AUTO: 7.7 10^3/UL (ref 4–10.5)

## 2019-01-24 PROCEDURE — 81001 URINALYSIS AUTO W/SCOPE: CPT

## 2019-01-24 PROCEDURE — 82962 GLUCOSE BLOOD TEST: CPT

## 2019-01-24 PROCEDURE — 99283 EMERGENCY DEPT VISIT LOW MDM: CPT

## 2019-01-24 PROCEDURE — 80053 COMPREHEN METABOLIC PANEL: CPT

## 2019-01-24 PROCEDURE — 96361 HYDRATE IV INFUSION ADD-ON: CPT

## 2019-01-24 PROCEDURE — 36415 COLL VENOUS BLD VENIPUNCTURE: CPT

## 2019-01-24 PROCEDURE — 85025 COMPLETE CBC W/AUTO DIFF WBC: CPT

## 2019-01-24 PROCEDURE — 81025 URINE PREGNANCY TEST: CPT

## 2019-01-24 PROCEDURE — 82803 BLOOD GASES ANY COMBINATION: CPT

## 2019-01-24 PROCEDURE — 96360 HYDRATION IV INFUSION INIT: CPT

## 2019-01-24 NOTE — ER DOCUMENT REPORT
ED General





- General


Chief Complaint: High Blood Sugar


Stated Complaint: BLOOD SUGAR ISSUE


Time Seen by Provider: 19 13:21


Primary Care Provider: 


SARAH SCHNEIDER MD [Primary Care Provider] - Follow up as needed


TRAVEL OUTSIDE OF THE [place] IN LAST 30 DAYS: No





- HPI


Notes: 





28-year-old female with type 2 diabetes who is insulin-dependent presents to the

ED for evaluation of hyperglycemia with a blood sugar of 507 who was sent by 

primary care for evaluation.  Patient states she has been forgetting to take her

insulin in the last couple days because she is not stopped by the pharmacy to 

pick it up.  Reports polyuria polydipsia polyphagia.  Has not been adjusting her

diet with consideration that she has diabetes.  Denies fevers, chills,  chest 

pain,palpitations,  shortness of breath, dyspnea, nausea, vomiting, diarrhea, 

abdominal pain, hematuria,blurred vision, double vision, loss of vision, speech 

changes, LH, dizziness, syncope, headaches, wheezing, ST, URI, neck pain, 

weakness, bowel or bladder dysfunction, saddle anesthesia, numbness or tingling 

in bilateral upper or lower extremities equally, muscle paralysis, weakness in 

bilateral upper or lower extremities equally or rash.





- Related Data


Allergies/Adverse Reactions: 


                                        





No Known Allergies Allergy (Verified 18 12:06)


   











Past Medical History





- General


Information source: Patient





- Social History


Smoking Status: Never Smoker


Frequency of alcohol use: None


Drug Abuse: None


Family History: Reviewed & Not Pertinent


Patient has suicidal ideation: No


Patient has homicidal ideation: No


Endocrine Medical History: Reports: Hx Diabetes Mellitus Type 2


Renal/ Medical History: Denies: Hx Peritoneal Dialysis


Past Surgical History: Reports: Hx  Section - x2





Review of Systems





- Review of Systems


Constitutional: See HPI


EENT: No symptoms reported


Cardiovascular: No symptoms reported


Respiratory: No symptoms reported


Gastrointestinal: No symptoms reported


Genitourinary: No symptoms reported


Female Genitourinary: No symptoms reported


Musculoskeletal: No symptoms reported


Skin: No symptoms reported


Hematologic/Lymphatic: No symptoms reported


Neurological/Psychological: No symptoms reported





Physical Exam





- Vital signs


Vitals: 


                                        











Temp Pulse Resp BP Pulse Ox


 


 98.0 F   76   16   135/81 H  96 


 


 19 12:29  19 12:29  19 12:29  19 12:19 12:29














- Notes


Notes: 





PHYSICAL EXAMINATION:





GENERAL: Well-appearing, well-nourished and in no acute distress.





HEAD: Atraumatic, normocephalic.





EYES: Pupils equal round and reactive to light, extraocular movements intact, 

conjunctiva are normal.





ENT: Nares patent, oropharynx clear without exudates.  Moist mucous membranes.





NECK: Normal range of motion, supple without lymphadenopathy





LUNGS: Breath sounds clear to auscultation bilaterally and equal.  No wheezes 

rales or rhonchi.





HEART: Regular rate and rhythm without murmurs





ABDOMEN: Soft, nontender, nondistended abdomen.  No guarding, no rebound.  No 

masses appreciated.





Female : deferred





Musculoskeletal: Normal range of motion, no pitting or edema.  No cyanosis.





NEUROLOGICAL: Cranial nerves grossly intact.  Normal speech, normal gait.  

Normal sensory, motor exams





PSYCH: Normal mood, normal affect.





SKIN: Warm, Dry, normal turgor, no rashes or lesions noted.





Course





- Re-evaluation


Re-evalutation: 





19 18:35


28-year-old female afebrile vitals stable no distress presents for hyperglycemia

due to not taking her insulin on a sliding scale because she has did forget to 

go to the pharmacy initial blood sugar 529, patient given IV fluids 10 units of 

insulin, blood sugar has reduced down to 230s, CBC negative for leukocytosis or 

anemia, CMP negative for renal or hepatic dysfunction, electrolytes normal minus

blood glucose level.  Urinalysis does have some ketones with glucouria.  After 2

bags of IV fluids 10 units of insulin. Presentation of asymptomatic 

hyperglycemia.  There is no evidence of HHS or diabetic ketoacidosis on 

laboratories or based on clinical history.  Patient's vitals are within normal 

limits.  They deny any acute focal complaints.  Treatment with insulin and IV 

fluids given here in the emergency department with appropriate response of the 

blood sugar.  Patient does have primary care follow-up.  Patient was instructed 

to continue taking their insulin and advised they will likely need to increase 

dietary modification and may also need medication changes. Patient does have i

nsulin prescription and they state they will comply the pharmacy to  

prescriptions.  Discussed with patient that she does need to follow-up with her 

primary care provider.  Patient is not nauseated or able to hold down p.o. 

liquids discussed the patient that she does need to be diligent about taking her

insulin on a daily basis following a low-carb diet and monitoring her blood 

sugars.  Indications to return to emergency department as well as the importance

of close outpatient follow-up were discussed at length.  Patient verbalized 

understanding of the need for close follow-up and indications to return to the 

ED.


 patient states that she feels much better, is ready to go home





- Vital Signs


Vital signs: 


                                        











Temp Pulse Resp BP Pulse Ox


 


 98.0 F   76   16   135/81 H  96 


 


 19 12:29  19 12:29  19 12:29  19 12:29  19 12:29














- Laboratory


Result Diagrams: 


                                 19 13:21





                                 19 13:21


Laboratory results interpreted by me: 


                                        











  19





  12:25 13:21 15:23


 


Sodium   134.1 L 


 


Chloride   96 L 


 


Glucose   531 H* 


 


POC Glucose    292 H


 


Alkaline Phosphatase   139 H 


 


Urine Glucose (UA)  >=500 H  


 


Urine Ketones  TRACE H  


 


Ur Leukocyte Esterase  TRACE H  














Discharge





- Discharge


Clinical Impression: 


 Hyperglycemia





Condition: Stable


Disposition: HOME, SELF-CARE


Instructions:  Hyperglycemia ([facility])


Additional Instructions: 


Return immediately for any new or worsening symptoms.





Follow up with primary care provider, call tomorrow to make followup 

appointment.


Forms:  Return to Work


Referrals: 


SARAH SCHNEIDER MD [Primary Care Provider] - Follow up tomorrow


SP KOHLER DO [NO LOCAL MD] - Follow up as needed